# Patient Record
Sex: MALE | Race: BLACK OR AFRICAN AMERICAN | NOT HISPANIC OR LATINO | Employment: FULL TIME | URBAN - METROPOLITAN AREA
[De-identification: names, ages, dates, MRNs, and addresses within clinical notes are randomized per-mention and may not be internally consistent; named-entity substitution may affect disease eponyms.]

---

## 2017-03-14 ENCOUNTER — APPOINTMENT (OUTPATIENT)
Dept: LAB | Facility: HOSPITAL | Age: 47
End: 2017-03-14

## 2017-03-14 ENCOUNTER — OFFICE VISIT (OUTPATIENT)
Dept: GASTROENTEROLOGY | Facility: CLINIC | Age: 47
End: 2017-03-14

## 2017-03-14 VITALS
HEART RATE: 95 BPM | HEIGHT: 67 IN | SYSTOLIC BLOOD PRESSURE: 120 MMHG | WEIGHT: 180.6 LBS | DIASTOLIC BLOOD PRESSURE: 82 MMHG | TEMPERATURE: 97.8 F | OXYGEN SATURATION: 99 % | BODY MASS INDEX: 28.35 KG/M2

## 2017-03-14 DIAGNOSIS — R93.5 ABNORMAL CT OF THE ABDOMEN: ICD-10-CM

## 2017-03-14 DIAGNOSIS — R19.4 CHANGE IN BOWEL HABITS: Primary | ICD-10-CM

## 2017-03-14 DIAGNOSIS — R63.4 WEIGHT LOSS, ABNORMAL: ICD-10-CM

## 2017-03-14 DIAGNOSIS — R10.30 LOWER ABDOMINAL PAIN: ICD-10-CM

## 2017-03-14 DIAGNOSIS — Z80.0 FAMILY HISTORY OF COLON CANCER: ICD-10-CM

## 2017-03-14 LAB
ALBUMIN SERPL-MCNC: 4.4 G/DL (ref 3.2–4.8)
ALBUMIN/GLOB SERPL: 1.4 G/DL (ref 1.5–2.5)
ALP SERPL-CCNC: 43 U/L (ref 25–100)
ALT SERPL W P-5'-P-CCNC: 29 U/L (ref 7–40)
ANION GAP SERPL CALCULATED.3IONS-SCNC: 3 MMOL/L (ref 3–11)
AST SERPL-CCNC: 20 U/L (ref 0–33)
BASOPHILS # BLD AUTO: 0.04 10*3/MM3 (ref 0–0.2)
BASOPHILS NFR BLD AUTO: 0.6 % (ref 0–1)
BILIRUB SERPL-MCNC: 0.4 MG/DL (ref 0.3–1.2)
BUN BLD-MCNC: 13 MG/DL (ref 9–23)
BUN/CREAT SERPL: 18.6 (ref 7–25)
CALCIUM SPEC-SCNC: 10.2 MG/DL (ref 8.7–10.4)
CHLORIDE SERPL-SCNC: 103 MMOL/L (ref 99–109)
CO2 SERPL-SCNC: 32 MMOL/L (ref 20–31)
CREAT BLD-MCNC: 0.7 MG/DL (ref 0.6–1.3)
CRP SERPL-MCNC: <0.12 MG/DL (ref 0–10)
DEPRECATED RDW RBC AUTO: 46.8 FL (ref 37–54)
EOSINOPHIL # BLD AUTO: 0.13 10*3/MM3 (ref 0.1–0.3)
EOSINOPHIL NFR BLD AUTO: 1.8 % (ref 0–3)
ERYTHROCYTE [DISTWIDTH] IN BLOOD BY AUTOMATED COUNT: 13.6 % (ref 11.3–14.5)
GFR SERPL CREATININE-BSD FRML MDRD: 148 ML/MIN/1.73
GLOBULIN UR ELPH-MCNC: 3.2 GM/DL
GLUCOSE BLD-MCNC: 92 MG/DL (ref 70–100)
HCT VFR BLD AUTO: 43.4 % (ref 38.9–50.9)
HGB BLD-MCNC: 14.8 G/DL (ref 13.1–17.5)
IMM GRANULOCYTES # BLD: 0.02 10*3/MM3 (ref 0–0.03)
IMM GRANULOCYTES NFR BLD: 0.3 % (ref 0–0.6)
LYMPHOCYTES # BLD AUTO: 1.71 10*3/MM3 (ref 0.6–4.8)
LYMPHOCYTES NFR BLD AUTO: 23.9 % (ref 24–44)
MCH RBC QN AUTO: 31.9 PG (ref 27–31)
MCHC RBC AUTO-ENTMCNC: 34.1 G/DL (ref 32–36)
MCV RBC AUTO: 93.5 FL (ref 80–99)
MONOCYTES # BLD AUTO: 0.85 10*3/MM3 (ref 0–1)
MONOCYTES NFR BLD AUTO: 11.9 % (ref 0–12)
NEUTROPHILS # BLD AUTO: 4.4 10*3/MM3 (ref 1.5–8.3)
NEUTROPHILS NFR BLD AUTO: 61.5 % (ref 41–71)
PLATELET # BLD AUTO: 227 10*3/MM3 (ref 150–450)
PMV BLD AUTO: 10.4 FL (ref 6–12)
POTASSIUM BLD-SCNC: 3.8 MMOL/L (ref 3.5–5.5)
PROT SERPL-MCNC: 7.6 G/DL (ref 5.7–8.2)
RBC # BLD AUTO: 4.64 10*6/MM3 (ref 4.2–5.76)
SODIUM BLD-SCNC: 138 MMOL/L (ref 132–146)
WBC NRBC COR # BLD: 7.15 10*3/MM3 (ref 3.5–10.8)

## 2017-03-14 PROCEDURE — 86140 C-REACTIVE PROTEIN: CPT | Performed by: NURSE PRACTITIONER

## 2017-03-14 PROCEDURE — 80053 COMPREHEN METABOLIC PANEL: CPT | Performed by: NURSE PRACTITIONER

## 2017-03-14 PROCEDURE — 36415 COLL VENOUS BLD VENIPUNCTURE: CPT | Performed by: NURSE PRACTITIONER

## 2017-03-14 PROCEDURE — 85025 COMPLETE CBC W/AUTO DIFF WBC: CPT | Performed by: NURSE PRACTITIONER

## 2017-03-14 PROCEDURE — 99204 OFFICE O/P NEW MOD 45 MIN: CPT | Performed by: NURSE PRACTITIONER

## 2017-03-14 RX ORDER — LISINOPRIL 20 MG/1
5 TABLET ORAL DAILY
COMMUNITY

## 2017-03-14 RX ORDER — DICYCLOMINE HCL 20 MG
20 TABLET ORAL EVERY 6 HOURS PRN
Qty: 20 TABLET | Refills: 1 | Status: SHIPPED | OUTPATIENT
Start: 2017-03-14

## 2017-03-14 NOTE — PATIENT INSTRUCTIONS
Diverticulosis  Diverticulosis is the condition that develops when small pouches (diverticula) form in the wall of your colon. Your colon, or large intestine, is where water is absorbed and stool is formed. The pouches form when the inside layer of your colon pushes through weak spots in the outer layers of your colon.  CAUSES   No one knows exactly what causes diverticulosis.  RISK FACTORS  · Being older than 50. Your risk for this condition increases with age. Diverticulosis is rare in people younger than 40 years. By age 80, almost everyone has it.  · Eating a low-fiber diet.  · Being frequently constipated.  · Being overweight.  · Not getting enough exercise.  · Smoking.  · Taking over-the-counter pain medicines, like aspirin and ibuprofen.  SYMPTOMS   Most people with diverticulosis do not have symptoms.  DIAGNOSIS   Because diverticulosis often has no symptoms, health care providers often discover the condition during an exam for other colon problems. In many cases, a health care provider will diagnose diverticulosis while using a flexible scope to examine the colon (colonoscopy).  TREATMENT   If you have never developed an infection related to diverticulosis, you may not need treatment. If you have had an infection before, treatment may include:  · Eating more fruits, vegetables, and grains.  · Taking a fiber supplement.  · Taking a live bacteria supplement (probiotic).  · Taking medicine to relax your colon.  HOME CARE INSTRUCTIONS   · Drink at least 6-8 glasses of water each day to prevent constipation.  · Try not to strain when you have a bowel movement.  · Keep all follow-up appointments.  If you have had an infection before:   · Increase the fiber in your diet as directed by your health care provider or dietitian.  · Take a dietary fiber supplement if your health care provider approves.  · Only take medicines as directed by your health care provider.  SEEK MEDICAL CARE IF:   · You have abdominal  pain.  · You have bloating.  · You have cramps.  · You have not gone to the bathroom in 3 days.  SEEK IMMEDIATE MEDICAL CARE IF:   · Your pain gets worse.  · Your bloating becomes very bad.  · You have a fever or chills, and your symptoms suddenly get worse.  · You begin vomiting.  · You have bowel movements that are bloody or black.  MAKE SURE YOU:  · Understand these instructions.  · Will watch your condition.  · Will get help right away if you are not doing well or get worse.     This information is not intended to replace advice given to you by your health care provider. Make sure you discuss any questions you have with your health care provider.     Document Released: 09/14/2005 Document Revised: 12/23/2014 Document Reviewed: 11/12/2014  [employer] Patient Education ©2016 [employer]    Diverticulitis  Diverticulitis is when small pockets that have formed in your colon (large intestine) become infected or swollen.  HOME CARE  · Follow your doctor's instructions.  · Follow a special diet if told by your doctor.  · When you feel better, your doctor may tell you to change your diet. You may be told to eat a lot of fiber. Fruits and vegetables are good sources of fiber. Fiber makes it easier to poop (have bowel movements).  · Take supplements or probiotics as told by your doctor.  · Only take medicines as told by your doctor.  · Keep all follow-up visits with your doctor.  GET HELP IF:  · Your pain does not get better.  · You have a hard time eating food.  · You are not pooping like normal.  GET HELP RIGHT AWAY IF:  · Your pain gets worse.  · Your problems do not get better.  · Your problems suddenly get worse.  · You have a fever.  · You keep throwing up (vomiting).  · You have bloody or black, tarry poop (stool).  MAKE SURE YOU:   · Understand these instructions.  · Will watch your condition.  · Will get help right away if you are not doing well or get worse.     This information is not intended to  replace advice given to you by your health care provider. Make sure you discuss any questions you have with your health care provider.     Document Released: 06/05/2009 Document Revised: 12/23/2014 Document Reviewed: 11/12/2014  [employer] Interactive Patient Education ©2016 [employer]

## 2017-03-14 NOTE — PROGRESS NOTES
NEW PATIENT NOTE  Patient: JAYME VASQUES : 1970  Date of Service: 2017  Dear Dr.Jonathan Lalo MD   Thank you for your referral of this patient for evaluation of abdominal pain  CC: Abdominal Pain and Nausea (w/ vomiting)  Assessment/Plan                                             ASSESSMENT & PLANS     Lower abdominal pain r/t nonspecific colitis, pelvic pain, groin pain, and/or inguinal hernia.  Change in bowel habits  Weight loss, abnormal Wt loss of 25 lbs w/in the last 2 wks, reportedly  Abnormal CT of the abdomen (mild thickening of the descending and sigmoid colon and incidental finding of a 9 mm hemangioma)  2nd Degree Family history of colon cancer in his maternal uncle and aunt and paternal aunt    -     Schedule for colonoscopy    -     Comprehensive Metabolic Panel  -     CBC & Differential  -     C-reactive Protein    -     Start dicyclomine (BENTYL) 20 MG tablet; Take 1 tablet by mouth Every 6 (Six) Hours As Needed (abd pain).  · Obtain outside medical records from [facility]      DISCUSSION: The above plan was delineated in details with patient and wife and all questions and concerns were answered.  Patient is also given contact information.  Patient is to return as scheduled or sooner if new problems arise  Subjective                                                     SUBJECTIVE   History of Present Illness  Mr. Jayme Vasques is a 46 y.o. male presents to the clinic today for an evaluation of Abdominal Pain and Nausea (w/ vomiting)    Onset 3wks ago     Location LLQ > RLQ     Triggers  Worsens by Pain is constant.  No specific trigger.  Eating or movement and certainly does not worsen pain.       Severity/Quality/  Frequency Pain sometimes radiates to back.  Pain does not wake pt up at night     Relieving factors  Nothing. Having a BM does not relieve pain     Associating Sx  + and - N/v. Anorexia       Wt loss of 25 lbs w/in the last 2 wks, reportedly. No  fever     Baseline BM x 2 a day while on Metformin.  However, change in bowel habit the last 3 wks with less frequent stools. He was taken off metformin recently due to decreased oral intake.  BM once every 3-4 days b/c less intake and not on Metformin (which pt was on since 2014).  Patient feels a little more constipated with decreased bowel movement frequency, but denies having hard/lumpy scope stool or straining or tenesmus.  Pt denies dark black stools or bright red blood in the stools, in the toilet bowl, or on the toilet tissue.     Prior Dx workup Hospitalized at [facility] for 1 night  US, CT scan, and blood tests.  See below     Prior Tx Flagyl and Levaquin x 7 days w/ sx improvement (n/v), but not abd pain     Pertinent Hx Fam Hx--Colon cancer in his maternal uncle and aunt and paternal aunt.  Dx in age 60s    No prior abdominal surgeries     Risk factors  Smokes marijuana  No NSAIDS or frequent abx    No recent travel outside of the [place]. Pt has no family hx of inflammatory bowel disease.     ROS:Review of Systems   Constitutional: Positive for activity change, appetite change (decrease) and unexpected weight change (loss). Negative for fatigue and fever.   HENT: Negative for hearing loss, trouble swallowing and voice change.    Eyes: Negative for visual disturbance.   Respiratory: Negative for cough, choking, chest tightness and shortness of breath.    Cardiovascular: Negative for chest pain.   Gastrointestinal: Positive for abdominal pain, nausea and vomiting. Negative for abdominal distention, anal bleeding, blood in stool, constipation, diarrhea and rectal pain.   Endocrine: Negative for polydipsia and polyphagia.   Genitourinary: Negative.    Musculoskeletal: Positive for back pain (lower). Negative for gait problem and joint swelling.   Skin: Negative for color change and rash.   Allergic/Immunologic: Negative for food allergies.   Neurological: Negative for dizziness, seizures and  speech difficulty.   Hematological: Negative for adenopathy.   Psychiatric/Behavioral: Positive for sleep disturbance (occasionallly). Negative for confusion.     PAST MED HX: Pt  has a past medical history of Diabetes mellitus since age 42 which patient has been on metformin.  Reportedly, patient is doing well with A1c in the low 6.0, and that PCP is planning to take patient off metformin and a couple months prior to all recent onset of abdominal pain/change in bowel habit  PAST SURG HX: Pt  has a past surgical history that includes No past surgeries.  FAM HX: family history includes Colon cancer in his maternal uncle and aunt and paternal aunt.  Dx in age 60s. Family history is negative for Crohn's disease, pancreas disease, liver disease, or ulcer.  SOC HX: Pt  reports that he has never smoked. He does not have any smokeless tobacco history on file. He reports that he uses illicit drugs, including Marijuana. He reports that he does not drink alcohol.  Objective                                                           OBJECTIVE   MEDS: •  lisinopril (PRINIVIL,ZESTRIL) 20 MG tablet, Take 5 mg by mouth Daily., Disp: , Rfl:   •  metFORMIN (GLUCOPHAGE) 500 MG tablet, Take 2,000 mg by mouth 2 (Two) Times a Day With Meals., Disp: , Rfl:     [Addendum: Per outside medical record from [facility]:  2/22/17: WBC 7.29 H&H 14.1/41.4 MCV 91.2 MCHC 34.1  ANC 4.32 AST 23 ALT 34 alkaline phosphatase 49 total protein 7.6 albumin 4.1 total bili 0.8 calcium 8.5 sodium 139 potassium 3.6 chloride 103 carbon dioxide 24 BUN 7  To 20/1/17: WBC 13.62 H&H 14.9/43.5     CT scan abdomen pelvis with IV contrast on 2/21/17: Line there is a 9 mm hyperdense lesion of the liver.  Solid abdominal organs and abdominal portions of the GI tract are otherwise unremarkable.  There is no pneumoperitoneum  There is mild thickening of the descending and sigmoid colon that could be related to under distention or early colitis.   The pelvic organs and portions of the GI tract, including the appendix are otherwise unremarkable.  There is no free fluid.    Abdominal ultrasound 2/28/17: The liver parenchyma is homogenous.  There are no focal masses identified.  Liver lesion seen on CT is not well visualized.  The gallbladder appears within normal limits.  There are no echogenic foci to suggest stone.  There is no gallbladder wall thickening or pericholecystic fluid.  The common bile duct is within normal limits, arguing against biliary dilatation.  Overall normal right upper quadrant abdominal pain.      Wt Readings from Last 5 Encounters:   03/14/17 180 lb 9.6 oz (81.9 kg)   body mass index is 28.29 kg/(m^2).,Temp: 97.8 °F (36.6 °C),BP: 120/82,Heart Rate: 95   Physical Exam   Abdominal:         General Well developed; well nourished; no acute distress.   ENT Good dentition.  Oral mucosa pink & moist without thrush or lesions.    Neck Neck supple; trachea midline. No thyromegaly   Resp CTA; no rhonchi, rales, or wheezes.  Respiration effort normal  CV RRR; normal S1, S2; no M/R/G. No lower extremity edema  GI Abd soft, tenderness to palpation in the area marked above, ND, normal active bowel sounds.  No HSM.  No abd hernia  Skin No rash; no lesions; no bruises.  Skin turgor normal  Musc No clubbing; no cyanosis.  Gait steady  Psych Oriented to time, place, and person.  Appropriate affect  Thank you kindly for allowing me to be part of this patient’s care.    Pt care team: [facility]--Gastroenterology  [phone]    CC: Dr.Jonathan Lalo MD  [address] / [place] [address] FAX:[phone]

## 2017-05-25 ENCOUNTER — OUTSIDE FACILITY SERVICE (OUTPATIENT)
Dept: GASTROENTEROLOGY | Facility: CLINIC | Age: 47
End: 2017-05-25

## 2017-05-25 PROCEDURE — 45378 DIAGNOSTIC COLONOSCOPY: CPT | Performed by: INTERNAL MEDICINE
